# Patient Record
Sex: FEMALE | Race: WHITE | NOT HISPANIC OR LATINO | Employment: UNEMPLOYED | URBAN - METROPOLITAN AREA
[De-identification: names, ages, dates, MRNs, and addresses within clinical notes are randomized per-mention and may not be internally consistent; named-entity substitution may affect disease eponyms.]

---

## 2017-10-29 ENCOUNTER — HOSPITAL ENCOUNTER (EMERGENCY)
Facility: MEDICAL CENTER | Age: 47
End: 2017-10-29
Attending: EMERGENCY MEDICINE
Payer: MEDICAID

## 2017-10-29 VITALS
HEART RATE: 77 BPM | BODY MASS INDEX: 23.08 KG/M2 | RESPIRATION RATE: 18 BRPM | SYSTOLIC BLOOD PRESSURE: 100 MMHG | DIASTOLIC BLOOD PRESSURE: 59 MMHG | WEIGHT: 147.05 LBS | TEMPERATURE: 98.6 F | OXYGEN SATURATION: 95 % | HEIGHT: 67 IN

## 2017-10-29 DIAGNOSIS — R31.9 URINARY TRACT INFECTION WITH HEMATURIA, SITE UNSPECIFIED: ICD-10-CM

## 2017-10-29 DIAGNOSIS — N39.0 URINARY TRACT INFECTION WITH HEMATURIA, SITE UNSPECIFIED: ICD-10-CM

## 2017-10-29 DIAGNOSIS — G89.29 OTHER CHRONIC PAIN: ICD-10-CM

## 2017-10-29 DIAGNOSIS — B35.1 NAIL FUNGUS: ICD-10-CM

## 2017-10-29 LAB
APPEARANCE UR: ABNORMAL
COLOR UR: ABNORMAL
GLUCOSE UR STRIP.AUTO-MCNC: NEGATIVE MG/DL
HCG UR QL: NEGATIVE
KETONES UR STRIP.AUTO-MCNC: NEGATIVE MG/DL
LEUKOCYTE ESTERASE UR QL STRIP.AUTO: ABNORMAL
NITRITE UR QL STRIP.AUTO: POSITIVE
PH UR STRIP.AUTO: 5.5 [PH]
PROT UR QL STRIP: 30 MG/DL
RBC UR QL AUTO: NEGATIVE
SP GR UR STRIP.AUTO: 1.02

## 2017-10-29 PROCEDURE — 87086 URINE CULTURE/COLONY COUNT: CPT

## 2017-10-29 PROCEDURE — 99283 EMERGENCY DEPT VISIT LOW MDM: CPT

## 2017-10-29 PROCEDURE — 81025 URINE PREGNANCY TEST: CPT

## 2017-10-29 PROCEDURE — 81002 URINALYSIS NONAUTO W/O SCOPE: CPT

## 2017-10-29 RX ORDER — PRENATAL VIT 91/IRON/FOLIC/DHA 28-975-200
COMBINATION PACKAGE (EA) ORAL
Qty: 15 G | Refills: 0 | Status: SHIPPED | OUTPATIENT
Start: 2017-10-29

## 2017-10-29 RX ORDER — METHADONE HYDROCHLORIDE 10 MG/ML
10 CONCENTRATE ORAL DAILY
COMMUNITY

## 2017-10-29 RX ORDER — M-VIT,TX,IRON,MINS/CALC/FOLIC 27MG-0.4MG
1 TABLET ORAL DAILY
COMMUNITY

## 2017-10-29 RX ORDER — IBUPROFEN 200 MG
400 TABLET ORAL EVERY 6 HOURS PRN
COMMUNITY

## 2017-10-29 RX ORDER — CIPROFLOXACIN 500 MG/1
500 TABLET, FILM COATED ORAL 2 TIMES DAILY
Qty: 6 TAB | Refills: 0 | Status: SHIPPED | OUTPATIENT
Start: 2017-10-29 | End: 2017-11-01

## 2017-10-29 ASSESSMENT — PAIN SCALES - GENERAL: PAINLEVEL_OUTOF10: 6

## 2017-10-29 NOTE — ED PROVIDER NOTES
ED Provider Note    CHIEF COMPLAINT  Chief Complaint   Patient presents with   • Nail Problem       HPI  Angelica Moran is a 46 y.o. female who presents Stating that she has had a chronic fingernail and toenail infection for the last few years. The patient has a history of methamphetamine and heroin abuse and currently is on methadone from the methadone clinic. The patient reports that Mendy lieberman worked previously for her infection, but her insurance has stopped paying for this. She is followed at the [facility] and says that she will need to go through courses of other antifungals that failed before they will approve the Jubila. The patient also reports burning with urination, intermittent fever. She is concerned that she has a urinary tract infection and says these symptoms are similar with previous urinary tract infections. Her last miss her period was 3 months ago. She says her periods are very irregular.    REVIEW OF SYSTEMS  See HPI for further details. All other systems are negative.     PAST MEDICAL HISTORY   has a past medical history of Chronic back pain; Fibroid, uterine; Narcotic dependence (CMS-HCC); Pyelonephritis; and Renal disorder.    SOCIAL HISTORY  Social History     Social History Main Topics   • Smoking status: Current Every Day Smoker     Packs/day: 0.16     Years: 30.00     Types: Cigarettes   • Smokeless tobacco: Never Used      Comment: 1/4 ppd   • Alcohol use No      Comment: quit alcohol 2 years   • Drug use:      Types: Inhaled      Comment: marijuania - last meth and heroin 7/4/14 12 yr hx    • Sexual activity: Not on file       SURGICAL HISTORY   has a past surgical history that includes hill by laparoscopy (5/9/08); hill by laparoscopy (5/9/08); gyn surgery; other abdominal surgery; appendectomy; and other orthopedic surgery.    CURRENT MEDICATIONS  Home Medications     Reviewed by Quoc Schmid (Pharmacy Tech) on 10/29/17 at 1017  Med List Status:  "Complete   Medication Last Dose Status   Efinaconazole (JUBLIA) 10 % Solution > 2 weeks Active   ibuprofen (MOTRIN) 200 MG Tab 10/29/2017 Active   methadone (DOLOPHINE) 10 MG/ML Conc 10/28/2017 Active   Terbinafine (LAMISIL EX) > 4 days Active   therapeutic multivitamin-minerals (THERAGRAN-M) Tab 10/28/2017 Active                ALLERGIES  Allergies   Allergen Reactions   • Nkda [No Known Drug Allergy]        PHYSICAL EXAM  VITAL SIGNS: /75   Pulse (!) 104   Temp 37 °C (98.6 °F)   Resp 18   Ht 1.708 m (5' 7.25\")   Wt 66.7 kg (147 lb 0.8 oz)   SpO2 96%   BMI 22.86 kg/m²  @DIEGO[[id number]::@   Pulse ox interpretation: I interpret this pulse ox as normal.  Constitutional: Alert in no apparent distress.  HENT: No signs of trauma, Bilateral external ears normal, Nose normal.   Eyes: Pupils are equal and reactive, Conjunctiva normal, Non-icteric.   Neck: Normal range of motion, No tenderness, Supple, No stridor.   Lymphatic: No lymphadenopathy noted.   Cardiovascular: Regular rate and rhythm, no murmurs.   Thorax & Lungs: Normal breath sounds, No respiratory distress, No wheezing, No chest tenderness.   Abdomen: Bowel sounds normal, Soft, No tenderness, No masses, No pulsatile masses. No peritoneal signs.  Skin: Warm, Dry, No erythema, No rash.   Extremities: Intact distal pulses, No edema, No tenderness, No cyanosis. Onychomycosis of multiple toe and fingernails with no evidence of bacterial cellulitis or paronychia.  Musculoskeletal: Good range of motion in all major joints. No tenderness to palpation or major deformities noted.   Neurologic: Alert , Normal motor function, Normal sensory function, No focal deficits noted.   Psychiatric: Affect normal, Judgment normal, Mood normal.       DIAGNOSTIC STUDIES / PROCEDURES      LABS  Labs Reviewed   POC UA - Abnormal; Notable for the following:        Result Value    POC Appearance Slightly Cloudy (*)     POC Protein 30 (*)     POC Nitrites Positive (*)     POC " Leukocyte Esterase Small (*)     All other components within normal limits   POC URINE PREGNANCY   POC URINALYSIS   POC URINE PREGNANCY               COURSE & MEDICAL DECISION MAKING  Pertinent Labs & Imaging studies reviewed. (See chart for details)    Differential diagnosis: UTI, pregnancy, onchomycosis    I reviewed the patient's previous urine culture result from 5/5/2014 that showed she had resistance to Bactrim, sensitivity to Cipro.    The patient's urine today was positive for infection, negative for pregnancy. I will order a urine culture. I will treat the patient with Cipro for UTI.    The patient reports that she would like a prescription for Lamisil for her chronic onchomycosis.    The patient will return for new or worsening symptoms and is stable at the time of discharge.    The patient is referred to her primary physician at the [place] [facility] where she has an appointment this week for blood pressure management, diabetic screening, and for all other preventative health concerns.    DISPOSITION:  Patient will be discharged home in stable condition.    FOLLOW UP:  [facility], Emergency Dept  [address]  [facility] [address] [phone]    If symptoms worsen    [address] [address]  [phone]    follow-up as scheduled      OUTPATIENT MEDICATIONS:  New Prescriptions    CIPROFLOXACIN (CIPRO) 500 MG TAB    Take 1 Tab by mouth 2 times a day for 3 days.    TERBINAFINE (LAMISIL) 1 % CREAM    Apply as directed          The patient will return for worsening symptoms and is stable at the time of discharge. The patient verbalizes understanding and will comply.    FINAL IMPRESSION  1. Urinary tract infection with hematuria, site unspecified    2. Nail fungus    3. Other chronic pain               Electronically signed by: Zay Enriquez, 10/29/2017 10:26 AM

## 2017-10-29 NOTE — DISCHARGE INSTRUCTIONS
Urinary Tract Infection  Urinary tract infections (UTIs) can develop anywhere along your urinary tract. Your urinary tract is your body's drainage system for removing wastes and extra water. Your urinary tract includes two kidneys, two ureters, a bladder, and a urethra. Your kidneys are a pair of bean-shaped organs. Each kidney is about the size of your fist. They are located below your ribs, one on each side of your spine.  CAUSES  Infections are caused by microbes, which are microscopic organisms, including fungi, viruses, and bacteria. These organisms are so small that they can only be seen through a microscope. Bacteria are the microbes that most commonly cause UTIs.  SYMPTOMS   Symptoms of UTIs may vary by age and gender of the patient and by the location of the infection. Symptoms in young women typically include a frequent and intense urge to urinate and a painful, burning feeling in the bladder or urethra during urination. Older women and men are more likely to be tired, shaky, and weak and have muscle aches and abdominal pain. A fever may mean the infection is in your kidneys. Other symptoms of a kidney infection include pain in your back or sides below the ribs, nausea, and vomiting.  DIAGNOSIS  To diagnose a UTI, your caregiver will ask you about your symptoms. Your caregiver also will ask to provide a urine sample. The urine sample will be tested for bacteria and white blood cells. White blood cells are made by your body to help fight infection.  TREATMENT   Typically, UTIs can be treated with medication. Because most UTIs are caused by a bacterial infection, they usually can be treated with the use of antibiotics. The choice of antibiotic and length of treatment depend on your symptoms and the type of bacteria causing your infection.  HOME CARE INSTRUCTIONS  · If you were prescribed antibiotics, take them exactly as your caregiver instructs you. Finish the medication even if you feel better after you  have only taken some of the medication.  · Drink enough water and fluids to keep your urine clear or pale yellow.  · Avoid caffeine, tea, and carbonated beverages. They tend to irritate your bladder.  · Empty your bladder often. Avoid holding urine for long periods of time.  · Empty your bladder before and after sexual intercourse.  · After a bowel movement, women should cleanse from front to back. Use each tissue only once.  SEEK MEDICAL CARE IF:   · You have back pain.  · You develop a fever.  · Your symptoms do not begin to resolve within 3 days.  SEEK IMMEDIATE MEDICAL CARE IF:   · You have severe back pain or lower abdominal pain.  · You develop chills.  · You have nausea or vomiting.  · You have continued burning or discomfort with urination.  MAKE SURE YOU:   · Understand these instructions.  · Will watch your condition.  · Will get help right away if you are not doing well or get worse.     This information is not intended to replace advice given to you by your health care provider. Make sure you discuss any questions you have with your health care provider.     Document Released: 09/27/2006 Document Revised: 01/08/2016 Document Reviewed: 01/25/2013  [employer] Interactive Patient Education ©2016 [employer]

## 2017-10-29 NOTE — ED NOTES
[facility] for infection in nails in hands and feet. States has used Lamisil. [facility] Jublia helped but states Medicaid is not covering it. Was seen at hospitals last week and has an appointment next week as well.

## 2017-10-31 LAB
BACTERIA UR CULT: NORMAL
SIGNIFICANT IND 70042: NORMAL
SITE SITE: NORMAL
SOURCE SOURCE: NORMAL

## 2018-04-29 ENCOUNTER — APPOINTMENT (OUTPATIENT)
Dept: RADIOLOGY | Facility: MEDICAL CENTER | Age: 48
End: 2018-04-29
Attending: EMERGENCY MEDICINE
Payer: MEDICAID

## 2018-04-29 ENCOUNTER — HOSPITAL ENCOUNTER (EMERGENCY)
Facility: MEDICAL CENTER | Age: 48
End: 2018-04-29
Attending: EMERGENCY MEDICINE
Payer: MEDICAID

## 2018-04-29 VITALS
HEART RATE: 78 BPM | TEMPERATURE: 97.8 F | OXYGEN SATURATION: 93 % | RESPIRATION RATE: 18 BRPM | HEIGHT: 67 IN | WEIGHT: 138.89 LBS | SYSTOLIC BLOOD PRESSURE: 118 MMHG | DIASTOLIC BLOOD PRESSURE: 68 MMHG | BODY MASS INDEX: 21.8 KG/M2

## 2018-04-29 DIAGNOSIS — K59.00 CONSTIPATION, UNSPECIFIED CONSTIPATION TYPE: ICD-10-CM

## 2018-04-29 LAB
ALBUMIN SERPL BCP-MCNC: 3.8 G/DL (ref 3.2–4.9)
ALBUMIN/GLOB SERPL: 1.3 G/DL
ALP SERPL-CCNC: 63 U/L (ref 30–99)
ALT SERPL-CCNC: 14 U/L (ref 2–50)
ANION GAP SERPL CALC-SCNC: 3 MMOL/L (ref 0–11.9)
AST SERPL-CCNC: 33 U/L (ref 12–45)
BASOPHILS # BLD AUTO: 0.4 % (ref 0–1.8)
BASOPHILS # BLD: 0.02 K/UL (ref 0–0.12)
BILIRUB SERPL-MCNC: 0.6 MG/DL (ref 0.1–1.5)
BUN SERPL-MCNC: 18 MG/DL (ref 8–22)
CALCIUM SERPL-MCNC: 8.2 MG/DL (ref 8.4–10.2)
CHLORIDE SERPL-SCNC: 106 MMOL/L (ref 96–112)
CO2 SERPL-SCNC: 26 MMOL/L (ref 20–33)
CREAT SERPL-MCNC: 0.99 MG/DL (ref 0.5–1.4)
EOSINOPHIL # BLD AUTO: 0.32 K/UL (ref 0–0.51)
EOSINOPHIL NFR BLD: 7 % (ref 0–6.9)
ERYTHROCYTE [DISTWIDTH] IN BLOOD BY AUTOMATED COUNT: 37.9 FL (ref 35.9–50)
GLOBULIN SER CALC-MCNC: 2.9 G/DL (ref 1.9–3.5)
GLUCOSE SERPL-MCNC: 100 MG/DL (ref 65–99)
HCT VFR BLD AUTO: 38.3 % (ref 37–47)
HGB BLD-MCNC: 13 G/DL (ref 12–16)
IMM GRANULOCYTES # BLD AUTO: 0 K/UL (ref 0–0.11)
IMM GRANULOCYTES NFR BLD AUTO: 0 % (ref 0–0.9)
LIPASE SERPL-CCNC: 18 U/L (ref 7–58)
LYMPHOCYTES # BLD AUTO: 2.02 K/UL (ref 1–4.8)
LYMPHOCYTES NFR BLD: 44 % (ref 22–41)
MCH RBC QN AUTO: 29.3 PG (ref 27–33)
MCHC RBC AUTO-ENTMCNC: 33.9 G/DL (ref 33.6–35)
MCV RBC AUTO: 86.3 FL (ref 81.4–97.8)
MONOCYTES # BLD AUTO: 0.38 K/UL (ref 0–0.85)
MONOCYTES NFR BLD AUTO: 8.3 % (ref 0–13.4)
NEUTROPHILS # BLD AUTO: 1.85 K/UL (ref 2–7.15)
NEUTROPHILS NFR BLD: 40.3 % (ref 44–72)
NRBC # BLD AUTO: 0 K/UL
NRBC BLD-RTO: 0 /100 WBC
PLATELET # BLD AUTO: 198 K/UL (ref 164–446)
PMV BLD AUTO: 9.1 FL (ref 9–12.9)
POTASSIUM SERPL-SCNC: 4.7 MMOL/L (ref 3.6–5.5)
PROT SERPL-MCNC: 6.7 G/DL (ref 6–8.2)
RBC # BLD AUTO: 4.44 M/UL (ref 4.2–5.4)
SODIUM SERPL-SCNC: 135 MMOL/L (ref 135–145)
WBC # BLD AUTO: 4.6 K/UL (ref 4.8–10.8)

## 2018-04-29 PROCEDURE — 74177 CT ABD & PELVIS W/CONTRAST: CPT

## 2018-04-29 PROCEDURE — 96374 THER/PROPH/DIAG INJ IV PUSH: CPT

## 2018-04-29 PROCEDURE — 85025 COMPLETE CBC W/AUTO DIFF WBC: CPT

## 2018-04-29 PROCEDURE — 80053 COMPREHEN METABOLIC PANEL: CPT

## 2018-04-29 PROCEDURE — 700117 HCHG RX CONTRAST REV CODE 255: Performed by: EMERGENCY MEDICINE

## 2018-04-29 PROCEDURE — 96375 TX/PRO/DX INJ NEW DRUG ADDON: CPT

## 2018-04-29 PROCEDURE — 99285 EMERGENCY DEPT VISIT HI MDM: CPT

## 2018-04-29 PROCEDURE — 36415 COLL VENOUS BLD VENIPUNCTURE: CPT

## 2018-04-29 PROCEDURE — 700111 HCHG RX REV CODE 636 W/ 250 OVERRIDE (IP): Performed by: EMERGENCY MEDICINE

## 2018-04-29 PROCEDURE — 83690 ASSAY OF LIPASE: CPT

## 2018-04-29 RX ORDER — ONDANSETRON 2 MG/ML
4 INJECTION INTRAMUSCULAR; INTRAVENOUS ONCE
Status: COMPLETED | OUTPATIENT
Start: 2018-04-29 | End: 2018-04-29

## 2018-04-29 RX ORDER — MORPHINE SULFATE 4 MG/ML
4 INJECTION, SOLUTION INTRAMUSCULAR; INTRAVENOUS ONCE
Status: COMPLETED | OUTPATIENT
Start: 2018-04-29 | End: 2018-04-29

## 2018-04-29 RX ADMIN — ONDANSETRON HYDROCHLORIDE 4 MG: 2 INJECTION, SOLUTION INTRAMUSCULAR; INTRAVENOUS at 19:31

## 2018-04-29 RX ADMIN — IOHEXOL 100 ML: 350 INJECTION, SOLUTION INTRAVENOUS at 19:20

## 2018-04-29 RX ADMIN — MORPHINE SULFATE 4 MG: 4 INJECTION INTRAVENOUS at 19:32

## 2018-04-29 ASSESSMENT — PAIN SCALES - GENERAL
PAINLEVEL_OUTOF10: 5
PAINLEVEL_OUTOF10: 7

## 2018-04-29 ASSESSMENT — LIFESTYLE VARIABLES: DO YOU DRINK ALCOHOL: NO

## 2018-04-30 NOTE — ED NOTES
Pt returned from ct. Updated on plan of care. Denies any needs at this time. Will continue to monitor

## 2018-04-30 NOTE — ED NOTES
"Pt presents with complaints of constipation and transitory abdominal pain \"for months.'  She is followed by her PCP.   "
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
## 2018-04-30 NOTE — ED NOTES
Written and verbal discharge instructions reviewed with pt, verbalized understanding. Vital signs WNL. Pt ambulated without difficulty  to discharge. States will be calling a cab for transportation home.

## 2018-04-30 NOTE — DISCHARGE INSTRUCTIONS
Constipation, Adult  Constipation is when a person has fewer bowel movements in a week than normal, has difficulty having a bowel movement, or has stools that are dry, hard, or larger than normal. Constipation may be caused by an underlying condition. It may become worse with age if a person takes certain medicines and does not take in enough fluids.  Follow these instructions at home:  Eating and drinking  · Eat foods that have a lot of fiber, such as fresh fruits and vegetables, whole grains, and beans.  · Limit foods that are high in fat, low in fiber, or overly processed, such as french fries, hamburgers, cookies, candies, and soda.  · Drink enough fluid to keep your urine clear or pale yellow.  General instructions  · Exercise regularly or as told by your health care provider.  · Go to the restroom when you have the urge to go. Do not hold it in.  · Take over-the-counter and prescription medicines only as told by your health care provider. These include any fiber supplements.  · Practice pelvic floor retraining exercises, such as deep breathing while relaxing the lower abdomen and pelvic floor relaxation during bowel movements.  · Watch your condition for any changes.  · Keep all follow-up visits as told by your health care provider. This is important.  Contact a health care provider if:  · You have pain that gets worse.  · You have a fever.  · You do not have a bowel movement after 4 days.  · You vomit.  · You are not hungry.  · You lose weight.  · You are bleeding from the anus.  · You have thin, pencil-like stools.  Get help right away if:  · You have a fever and your symptoms suddenly get worse.  · You leak stool or have blood in your stool.  · Your abdomen is bloated.  · You have severe pain in your abdomen.  · You feel dizzy or you faint.  This information is not intended to replace advice given to you by your health care provider. Make sure you discuss any questions you have with your health care  provider.  Document Released: 09/15/2005 Document Revised: 07/07/2017 Document Reviewed: 06/07/2017  [employer] Interactive Patient Education © 2017 [employer]

## 2018-04-30 NOTE — ED NOTES
"Pt reports \"pain a little better\" updated on plan of care. Will continue to monitor. Call light within reach  "

## 2018-04-30 NOTE — ED NOTES
Assessment complete. IV placed, blood sent to the lab. Pt updated on plan of care. Reports has been using prescribed enemas, suppositories and over the counter medications to help relieve constipation x 1 week. abd tender to palpation huong lower quadrants, firm. Pt reports fever, n/v x 2 days.

## 2020-12-15 ENCOUNTER — HOSPITAL ENCOUNTER (EMERGENCY)
Facility: MEDICAL CENTER | Age: 50
End: 2020-12-15
Attending: EMERGENCY MEDICINE
Payer: MEDICAID

## 2020-12-15 VITALS
TEMPERATURE: 97.2 F | HEART RATE: 69 BPM | DIASTOLIC BLOOD PRESSURE: 57 MMHG | OXYGEN SATURATION: 97 % | RESPIRATION RATE: 16 BRPM | SYSTOLIC BLOOD PRESSURE: 102 MMHG

## 2020-12-15 DIAGNOSIS — S01.81XA FACIAL LACERATION, INITIAL ENCOUNTER: ICD-10-CM

## 2020-12-15 PROCEDURE — 99282 EMERGENCY DEPT VISIT SF MDM: CPT

## 2020-12-15 PROCEDURE — 303353 HCHG DERMABOND SKIN ADHESIVE

## 2020-12-15 PROCEDURE — 304999 HCHG REPAIR-SIMPLE/INTERMED LEVEL 1

## 2020-12-15 NOTE — ED NOTES
Pt's friend angelia called but no answer, voice mail/message left for the pt's friend angelia at [phone]

## 2020-12-15 NOTE — ED PROVIDER NOTES
"ED Provider Note    \"    CHIEF COMPLAINT  Chief Complaint   Patient presents with   • Head Laceration     Pt staying at [facility] and walked into the wall. Lac to forehead, bleeding controlled. Denied LOC, blood thinners.        HPI  Angelica Moran is a 50 y.o. female who presents with a laceration of the forehead.  Walked into the corner of a wall.  Sustained a laceration.  No loss of consciousness.  No headache.  She felt dazed and nauseous immediately after but no symptoms now.  Bleeding controlled.  Mild pain at the location.  Unknown tetanus.    REVIEW OF SYSTEMS  No numbness or weakness    PAST MEDICAL HISTORY  Past Medical History:   Diagnosis Date   • Chronic back pain    • Fibroid, uterine    • Narcotic dependence (HCC)    • Pyelonephritis    • Renal disorder     renal calculi       SOCIAL HISTORY  Social History     Tobacco Use   • Smoking status: Current Every Day Smoker     Packs/day: 0.16     Years: 30.00     Pack years: 4.80     Types: Cigarettes   • Smokeless tobacco: Never Used   • Tobacco comment: 1/4 ppd   Substance Use Topics   • Alcohol use: No     Comment: quit alcohol 2 years   • Drug use: Yes     Types: Inhaled     Comment: marijuania - last meth and heroin 18 months ago       CURRENT MEDICATIONS  Home Medications    **Home medications have not yet been reviewed for this encounter**         ALLERGIES  Allergies   Allergen Reactions   • Nkda [No Known Drug Allergy]        PHYSICAL EXAM  VITAL SIGNS: /75   Pulse 92   Temp 35.9 °C (96.7 °F) (Temporal)   Resp 16   SpO2 96%    Constitutional: No distress  HENT: Vertical laceration just medial to the right eyebrow that is well approximated.  Minimal associated hematoma.  Eyes: Injected conjunctiva.  Streaking mascara.  Cardiovascular: Normal heart rate  Thorax & Lungs: No respiratory distress  Skin: As above  Extremities: As mentioned above  Neurologic: Awake alert oriented.  Symmetric motor and sensory.  Steady gait  Psychiatric: " "Affect normal      COURSE & MEDICAL DECISION MAKING  Patient is neurovascularly intact. No foreign bodies. No motor dysfunction.    Laceration Repair Procedure Note    Indication: Laceration    Procedure: The patient was placed in the appropriate position wound was cleansed with Betadine and saline.  Dermabond was applied.      Total repaired wound length: 3 cm.     The patient tolerated the procedure well.    Should wash wound twice daily. Keep clean at all times. Return to the emergency department for any signs of infection, including: Fevers, expanding redness, purulent drainage.  Standard skin glue instructions given    FINAL IMPRESSION  1. Laceration         This dictation was created using voice recognition software. The accuracy of the dictation is limited to the abilities of the software. I expect there may be some errors of grammar and possibly content. The nursing notes were reviewed and certain aspects of this information were incorporated into this note.    Electronically signed by: Luke Mills M.D., 12/15/2020 6:56 AM  \"    "

## 2020-12-15 NOTE — ED NOTES
Discharge instructions given to pt including follow up with pcp or returning if no improvement of symptoms or to return if worse. Questions answered by RN. Denies any new complaints. Discharged w/stable vitals and able to ambulate w/steady gait. Laceration in right forehead bleeding controlled. dermabond dry, educated on how to keep laceration clean and to watch for s/sx of infection.

## 2020-12-15 NOTE — ED TRIAGE NOTES
Chief Complaint   Patient presents with   • Head Laceration     Pt staying at [facility] and walked into the wall. Lac to forehead, bleeding controlled. Denied LOC, blood thinners.      Pt amb to triage with steady gait for above complaint.   Pt is alert and oriented, speaking in full sentences, follows commands and responds appropriately to questions. Resp are even and unlabored. No obvious acute distress.    Pt placed in lobby. Pt educated on triage process. Pt encouraged to alert staff for any changes.    Vitals:    12/15/20 0442   BP: 112/75   Pulse: 92   Resp: 16   Temp: 35.9 °C (96.7 °F)   SpO2: 96%

## 2024-06-27 ENCOUNTER — APPOINTMENT (OUTPATIENT)
Dept: RADIOLOGY | Facility: MEDICAL CENTER | Age: 54
End: 2024-06-27
Attending: STUDENT IN AN ORGANIZED HEALTH CARE EDUCATION/TRAINING PROGRAM
Payer: MEDICAID

## 2024-06-27 ENCOUNTER — HOSPITAL ENCOUNTER (EMERGENCY)
Facility: MEDICAL CENTER | Age: 54
End: 2024-06-27
Attending: STUDENT IN AN ORGANIZED HEALTH CARE EDUCATION/TRAINING PROGRAM
Payer: MEDICAID

## 2024-06-27 VITALS
DIASTOLIC BLOOD PRESSURE: 62 MMHG | WEIGHT: 135 LBS | TEMPERATURE: 98.2 F | HEART RATE: 72 BPM | HEIGHT: 67 IN | BODY MASS INDEX: 21.19 KG/M2 | SYSTOLIC BLOOD PRESSURE: 114 MMHG | OXYGEN SATURATION: 96 % | RESPIRATION RATE: 16 BRPM

## 2024-06-27 DIAGNOSIS — S82.892A CLOSED AVULSION FRACTURE OF LEFT ANKLE, INITIAL ENCOUNTER: ICD-10-CM

## 2024-06-27 PROCEDURE — 73590 X-RAY EXAM OF LOWER LEG: CPT | Mod: LT

## 2024-06-27 PROCEDURE — 73630 X-RAY EXAM OF FOOT: CPT | Mod: LT

## 2024-06-27 PROCEDURE — 700102 HCHG RX REV CODE 250 W/ 637 OVERRIDE(OP): Performed by: STUDENT IN AN ORGANIZED HEALTH CARE EDUCATION/TRAINING PROGRAM

## 2024-06-27 PROCEDURE — 73610 X-RAY EXAM OF ANKLE: CPT | Mod: LT

## 2024-06-27 PROCEDURE — A9270 NON-COVERED ITEM OR SERVICE: HCPCS | Performed by: STUDENT IN AN ORGANIZED HEALTH CARE EDUCATION/TRAINING PROGRAM

## 2024-06-27 PROCEDURE — 99284 EMERGENCY DEPT VISIT MOD MDM: CPT

## 2024-06-27 RX ORDER — OXYCODONE HYDROCHLORIDE 5 MG/1
5 TABLET ORAL ONCE
Status: COMPLETED | OUTPATIENT
Start: 2024-06-27 | End: 2024-06-27

## 2024-06-27 RX ORDER — FLUOXETINE 10 MG/1
10 CAPSULE ORAL DAILY
COMMUNITY

## 2024-06-27 RX ADMIN — OXYCODONE 5 MG: 5 TABLET ORAL at 03:45

## 2024-06-27 ASSESSMENT — LIFESTYLE VARIABLES
HAVE YOU EVER FELT YOU SHOULD CUT DOWN ON YOUR DRINKING: NO
DO YOU DRINK ALCOHOL: NO
EVER FELT BAD OR GUILTY ABOUT YOUR DRINKING: NO
EVER HAD A DRINK FIRST THING IN THE MORNING TO STEADY YOUR NERVES TO GET RID OF A HANGOVER: NO
AVERAGE NUMBER OF DAYS PER WEEK YOU HAVE A DRINK CONTAINING ALCOHOL: 0
CONSUMPTION TOTAL: NEGATIVE
TOTAL SCORE: 0
HOW MANY TIMES IN THE PAST YEAR HAVE YOU HAD 5 OR MORE DRINKS IN A DAY: 0
TOTAL SCORE: 0
ON A TYPICAL DAY WHEN YOU DRINK ALCOHOL HOW MANY DRINKS DO YOU HAVE: 0
TOTAL SCORE: 0
HAVE PEOPLE ANNOYED YOU BY CRITICIZING YOUR DRINKING: NO

## 2024-06-27 NOTE — ED TRIAGE NOTES
"Chief Complaint   Patient presents with    Ankle Pain     PT BIB EMS from the [facility] for ankle injury. Pt was sitting at a slot machine for about an hour, pt went to get up and she rolled her ankle. CSM intact. Noticeable swelling to left ankle. Pt was given 1000mg of tylenol and 600 of ibuprofen      /67   Pulse 73   Temp 36.6 °C (97.8 °F) (Temporal)   Ht 1.702 m (5' 7\")   Wt 61.2 kg (135 lb)   SpO2 97%   BMI 21.14 kg/m²       "

## 2024-06-27 NOTE — DISCHARGE INSTRUCTIONS
You were seen for ankle pain. On the ankle x-ray, there is a small area of the bone that appears to be chipped away which may be a tiny fracture however this is a stable fracture. Use the boot and crutches as you need them for pain. Take tylenol 1000 mg and ibuprofen 600 mg every 6 hours for pain. I have referred you to orthopedics for further care. Call them tomorrow to schedule follow up.     You should also rest, use ice, elevate your leg above the level of your heart whenever possible to help with the pain and swelling.     DX-FOOT-COMPLETE 3+ LEFT   Final Result         1.  No acute traumatic bony injury.      DX-ANKLE 3+ VIEWS LEFT   Final Result         1.  Cortical irregularity of the tip the lateral malleolus, appearance compatible small ligamentous avulsion fracture fragment.         DX-TIBIA AND FIBULA LEFT   Final Result         1.  Cortical irregularity at the tip the lateral malleolus, appearance compatible with small ligaments avulsion fracture fragment.

## 2024-06-27 NOTE — ED PROVIDER NOTES
ED Provider Note    CHIEF COMPLAINT  Chief Complaint   Patient presents with    Ankle Pain     PT BIB EMS from the [facility] for ankle injury. Pt was sitting at a slot machine for about an hour, pt went to get up and she rolled her ankle. CSM intact. Noticeable swelling to left ankle. Pt was given 1000mg of tylenol and 600 of ibuprofen        EXTERNAL RECORDS REVIEWED  Inpatient Notes patient was admitted for pyelonephritis in 2011    HPI/ROS  LIMITATION TO HISTORY   Select: : None  OUTSIDE HISTORIAN(S):  None    Angelica Moran is a 53 y.o. female who presents for evaluation of left ankle pain.  This started just prior to arrival.  She is at the [employer] on machine.  She was at the machine for approximately an hour.  She got up to walk away from the machine and rolled her ankle.  She has been unable to walk due to ankle pain.  Pain mainly in the left lateral ankle.  She not hit her head or lose consciousness.  She has no neck or back pain she has no chest pain or abdominal pain.  She is given Tylenol and Motrin by EMS.  She denies any history of previous ankle injury    PAST MEDICAL HISTORY   has a past medical history of Chronic back pain, Fibroid, uterine, Narcotic dependence (HCC), Pyelonephritis, and Renal disorder.    SURGICAL HISTORY   has a past surgical history that includes hill by laparoscopy (5/9/08); hill by laparoscopy (5/9/08); gyn surgery; other abdominal surgery; appendectomy; and other orthopedic surgery.    FAMILY HISTORY  No family history on file.    SOCIAL HISTORY  Social History     Tobacco Use    Smoking status: Every Day     Current packs/day: 0.16     Average packs/day: 0.2 packs/day for 30.0 years (4.8 ttl pk-yrs)     Types: Cigarettes    Smokeless tobacco: Never    Tobacco comments:     1/4 ppd   Substance and Sexual Activity    Alcohol use: No     Comment: quit alcohol 2 years    Drug use: Yes     Types: Inhaled     Comment: marijuania - last meth and heroin 18 months  "ago    Sexual activity: Not on file       CURRENT MEDICATIONS  Home Medications    **Home medications have not yet been reviewed for this encounter**         ALLERGIES  Allergies   Allergen Reactions    Nkda [No Known Drug Allergy]        PHYSICAL EXAM  VITAL SIGNS: /67   Pulse 73   Temp 36.6 °C (97.8 °F) (Temporal)   Resp 16   Ht 1.702 m (5' 7\")   Wt 61.2 kg (135 lb)   SpO2 97%   BMI 21.14 kg/m²      Constitutional: Well developed, Well nourished, No acute respiratory distress, Non-toxic appearance.   HENT: Normocephalic, Atraumatic,   Neck: Normal range of motion. Supple. No midline C spine tenderness, step off or deformity  Cardiovascular: Normal heart rate, Normal rhythm, No murmurs, No rubs, No gallops.   Thorax & Lungs: Clear to auscultation bilaterally, No respiratory distress, No wheezing.  Abdomen: Soft nontender   Skin: Warm, Dry, No erythema, No rash.   Extremities: Intact distal pulses, Left ankle with swelling, tenderness to lateral malleolus, mild tenderness to mid tib/fib but no tenderness to left hip, femur, knee, has full range of motion to the hip and knee. Mild tenderness to dorsal aspect of foot but moreso in the ankle. No tenderness to the base of the fifth metatarsal.  Bilateral upper extremities are atraumatic with full range of motion all major joints, right lower extremity is atraumatic with full range of motion in all major joints  Neurologic: Alert & oriented. No focal deficits noted.   Psych: Alert normal affect       RADIOLOGY/PROCEDURES   I have independently interpreted the diagnostic imaging associated with this visit and am waiting the final reading from the radiologist.   My preliminary interpretation is as follows: no dislocation    Radiologist interpretation:  DX-FOOT-COMPLETE 3+ LEFT   Final Result         1.  No acute traumatic bony injury.      DX-ANKLE 3+ VIEWS LEFT   Final Result         1.  Cortical irregularity of the tip the lateral malleolus, appearance " compatible small ligamentous avulsion fracture fragment.         DX-TIBIA AND FIBULA LEFT   Final Result         1.  Cortical irregularity at the tip the lateral malleolus, appearance compatible with small ligaments avulsion fracture fragment.          COURSE & MEDICAL DECISION MAKING    ASSESSMENT, COURSE AND PLAN  Care Narrative:   This is a 53-year-old female with history as noted above who presents with left ankle pain after she rolled her ankle when she was stated.  No changes prior to arrival.  On arrival her vital signs are stable.  She did not hit her head or lose consciousness.  She has no pain to her neck or back.  She has no pain to her chest or abdomen.  No indication for laboratory studies.  Pain is limited to the ankle mostly on the left lateral side.  She does have some swelling about the ankle joint.  The left lower extremity is neurovascularly intact.  She otherwise has full range of motion in all of her other joints.  I considered fracture, sprain, dislocation.  X-rays of the tib-fib, ankle, foot were performed and shows cortical irregularity of the tip of the lateral malleolus which may represent a small ligamentous avulsion fracture fragment.  Otherwise there is no obvious fracture.    Patient will be placed in a walking boot and given crutches.  Referral to orthopedics placed.  Discussed results with patient.  She is awake and tolerating p.o.  She has no new complaints.  Advised Tylenol and Motrin for pain as well as rest, ice, elevation of the extremity.  She is to return for any new or worsening symptoms.  Patient is agreeable to discharge plan over the questions.          ADDITIONAL PROBLEMS MANAGED  None    DISPOSITION AND DISCUSSIONS  I have discussed management of the patient with the following physicians and TL's:  None    Discussion of management with other QHP or appropriate source(s): None     Escalation of care considered, and ultimately not performed:Laboratory analysis    Barriers  to care at this time, including but not limited to:  None .     Decision tools and prescription drugs considered including, but not limited to:  None .     The patient will return for new or worsening symptoms and is stable at the time of discharge.    The patient is referred to a primary physician for blood pressure management, diabetic screening, and for all other preventative health concerns.      DISPOSITION:  Patient will be discharged home in stable condition.    FOLLOW UP:  Belen Stephen M.D.  [address] [place] [id number]  [phone]            FINAL DIAGNOSIS  1. Closed avulsion fracture of left ankle, initial encounter           Electronically signed by: Dora Villanueva M.D., 6/27/2024 3:28 AM

## 2025-05-18 ENCOUNTER — HOSPITAL ENCOUNTER (EMERGENCY)
Facility: MEDICAL CENTER | Age: 55
End: 2025-05-18
Attending: STUDENT IN AN ORGANIZED HEALTH CARE EDUCATION/TRAINING PROGRAM
Payer: MEDICAID

## 2025-05-18 VITALS
TEMPERATURE: 96.6 F | BODY MASS INDEX: 20.83 KG/M2 | RESPIRATION RATE: 21 BRPM | WEIGHT: 132.72 LBS | OXYGEN SATURATION: 95 % | SYSTOLIC BLOOD PRESSURE: 101 MMHG | HEART RATE: 84 BPM | HEIGHT: 67 IN | DIASTOLIC BLOOD PRESSURE: 58 MMHG

## 2025-05-18 DIAGNOSIS — N39.0 ACUTE UTI: Primary | ICD-10-CM

## 2025-05-18 DIAGNOSIS — L30.9 DERMATITIS: ICD-10-CM

## 2025-05-18 LAB
ALBUMIN SERPL BCP-MCNC: 4 G/DL (ref 3.2–4.9)
ALBUMIN/GLOB SERPL: 1.2 G/DL
ALP SERPL-CCNC: 120 U/L (ref 30–99)
ALT SERPL-CCNC: 17 U/L (ref 2–50)
ANION GAP SERPL CALC-SCNC: 10 MMOL/L (ref 7–16)
APPEARANCE UR: CLEAR
AST SERPL-CCNC: 24 U/L (ref 12–45)
BACTERIA #/AREA URNS HPF: ABNORMAL /HPF
BASOPHILS # BLD AUTO: 0.6 % (ref 0–1.8)
BASOPHILS # BLD: 0.03 K/UL (ref 0–0.12)
BILIRUB SERPL-MCNC: 0.2 MG/DL (ref 0.1–1.5)
BILIRUB UR QL STRIP.AUTO: NEGATIVE
BUN SERPL-MCNC: 18 MG/DL (ref 8–22)
CALCIUM ALBUM COR SERPL-MCNC: 8.9 MG/DL (ref 8.5–10.5)
CALCIUM SERPL-MCNC: 8.9 MG/DL (ref 8.5–10.5)
CASTS URNS QL MICRO: ABNORMAL /LPF (ref 0–2)
CHLORIDE SERPL-SCNC: 101 MMOL/L (ref 96–112)
CO2 SERPL-SCNC: 25 MMOL/L (ref 20–33)
COLOR UR: YELLOW
CREAT SERPL-MCNC: 1 MG/DL (ref 0.5–1.4)
EOSINOPHIL # BLD AUTO: 0.13 K/UL (ref 0–0.51)
EOSINOPHIL NFR BLD: 2.7 % (ref 0–6.9)
EPITHELIAL CELLS 1715: ABNORMAL /HPF (ref 0–5)
ERYTHROCYTE [DISTWIDTH] IN BLOOD BY AUTOMATED COUNT: 41.2 FL (ref 35.9–50)
GFR SERPLBLD CREATININE-BSD FMLA CKD-EPI: 66 ML/MIN/1.73 M 2
GLOBULIN SER CALC-MCNC: 3.4 G/DL (ref 1.9–3.5)
GLUCOSE SERPL-MCNC: 78 MG/DL (ref 65–99)
GLUCOSE UR STRIP.AUTO-MCNC: NEGATIVE MG/DL
HCT VFR BLD AUTO: 38.5 % (ref 37–47)
HGB BLD-MCNC: 13 G/DL (ref 12–16)
IMM GRANULOCYTES # BLD AUTO: 0.01 K/UL (ref 0–0.11)
IMM GRANULOCYTES NFR BLD AUTO: 0.2 % (ref 0–0.9)
KETONES UR STRIP.AUTO-MCNC: NEGATIVE MG/DL
LEUKOCYTE ESTERASE UR QL STRIP.AUTO: ABNORMAL
LIPASE SERPL-CCNC: 19 U/L (ref 11–82)
LYMPHOCYTES # BLD AUTO: 1.97 K/UL (ref 1–4.8)
LYMPHOCYTES NFR BLD: 40.8 % (ref 22–41)
MCH RBC QN AUTO: 29.4 PG (ref 27–33)
MCHC RBC AUTO-ENTMCNC: 33.8 G/DL (ref 32.2–35.5)
MCV RBC AUTO: 87.1 FL (ref 81.4–97.8)
MICRO URNS: ABNORMAL
MONOCYTES # BLD AUTO: 0.55 K/UL (ref 0–0.85)
MONOCYTES NFR BLD AUTO: 11.4 % (ref 0–13.4)
NEUTROPHILS # BLD AUTO: 2.14 K/UL (ref 1.82–7.42)
NEUTROPHILS NFR BLD: 44.3 % (ref 44–72)
NITRITE UR QL STRIP.AUTO: NEGATIVE
NRBC # BLD AUTO: 0 K/UL
NRBC BLD-RTO: 0 /100 WBC (ref 0–0.2)
PH UR STRIP.AUTO: 7 [PH] (ref 5–8)
PLATELET # BLD AUTO: 225 K/UL (ref 164–446)
PMV BLD AUTO: 9.3 FL (ref 9–12.9)
POTASSIUM SERPL-SCNC: 4.3 MMOL/L (ref 3.6–5.5)
PROT SERPL-MCNC: 7.4 G/DL (ref 6–8.2)
PROT UR QL STRIP: NEGATIVE MG/DL
RBC # BLD AUTO: 4.42 M/UL (ref 4.2–5.4)
RBC # URNS HPF: ABNORMAL /HPF
RBC UR QL AUTO: NEGATIVE
SODIUM SERPL-SCNC: 136 MMOL/L (ref 135–145)
SP GR UR STRIP.AUTO: 1.01
UROBILINOGEN UR STRIP.AUTO-MCNC: 0.2 EU/DL
WBC # BLD AUTO: 4.8 K/UL (ref 4.8–10.8)
WBC #/AREA URNS HPF: ABNORMAL /HPF

## 2025-05-18 PROCEDURE — 80053 COMPREHEN METABOLIC PANEL: CPT

## 2025-05-18 PROCEDURE — 700102 HCHG RX REV CODE 250 W/ 637 OVERRIDE(OP): Performed by: STUDENT IN AN ORGANIZED HEALTH CARE EDUCATION/TRAINING PROGRAM

## 2025-05-18 PROCEDURE — 87186 SC STD MICRODIL/AGAR DIL: CPT

## 2025-05-18 PROCEDURE — 36415 COLL VENOUS BLD VENIPUNCTURE: CPT

## 2025-05-18 PROCEDURE — 87086 URINE CULTURE/COLONY COUNT: CPT

## 2025-05-18 PROCEDURE — 81001 URINALYSIS AUTO W/SCOPE: CPT

## 2025-05-18 PROCEDURE — 83690 ASSAY OF LIPASE: CPT

## 2025-05-18 PROCEDURE — 99285 EMERGENCY DEPT VISIT HI MDM: CPT

## 2025-05-18 PROCEDURE — 85025 COMPLETE CBC W/AUTO DIFF WBC: CPT

## 2025-05-18 PROCEDURE — 700111 HCHG RX REV CODE 636 W/ 250 OVERRIDE (IP): Mod: JZ | Performed by: STUDENT IN AN ORGANIZED HEALTH CARE EDUCATION/TRAINING PROGRAM

## 2025-05-18 PROCEDURE — 96374 THER/PROPH/DIAG INJ IV PUSH: CPT

## 2025-05-18 PROCEDURE — A9270 NON-COVERED ITEM OR SERVICE: HCPCS | Performed by: STUDENT IN AN ORGANIZED HEALTH CARE EDUCATION/TRAINING PROGRAM

## 2025-05-18 PROCEDURE — 96375 TX/PRO/DX INJ NEW DRUG ADDON: CPT

## 2025-05-18 PROCEDURE — 87077 CULTURE AEROBIC IDENTIFY: CPT

## 2025-05-18 RX ORDER — SULFAMETHOXAZOLE AND TRIMETHOPRIM 800; 160 MG/1; MG/1
1 TABLET ORAL 2 TIMES DAILY
Qty: 10 TABLET | Refills: 0 | Status: ACTIVE | OUTPATIENT
Start: 2025-05-18 | End: 2025-05-23

## 2025-05-18 RX ORDER — OXYCODONE HYDROCHLORIDE 10 MG/1
10 TABLET ORAL ONCE
Refills: 0 | Status: COMPLETED | OUTPATIENT
Start: 2025-05-18 | End: 2025-05-18

## 2025-05-18 RX ORDER — PHENAZOPYRIDINE HYDROCHLORIDE 200 MG/1
200 TABLET, FILM COATED ORAL 3 TIMES DAILY PRN
Qty: 6 TABLET | Refills: 0 | Status: SHIPPED | OUTPATIENT
Start: 2025-05-18

## 2025-05-18 RX ORDER — KETOROLAC TROMETHAMINE 15 MG/ML
15 INJECTION, SOLUTION INTRAMUSCULAR; INTRAVENOUS ONCE
Status: COMPLETED | OUTPATIENT
Start: 2025-05-18 | End: 2025-05-18

## 2025-05-18 RX ORDER — BENZOCAINE/MENTHOL 6 MG-10 MG
1 LOZENGE MUCOUS MEMBRANE 2 TIMES DAILY
Qty: 1 EACH | Refills: 0 | Status: SHIPPED | OUTPATIENT
Start: 2025-05-18 | End: 2025-06-17

## 2025-05-18 RX ORDER — MORPHINE SULFATE 4 MG/ML
4 INJECTION INTRAVENOUS ONCE
Status: COMPLETED | OUTPATIENT
Start: 2025-05-18 | End: 2025-05-18

## 2025-05-18 RX ORDER — SULFAMETHOXAZOLE AND TRIMETHOPRIM 800; 160 MG/1; MG/1
1 TABLET ORAL ONCE
Status: COMPLETED | OUTPATIENT
Start: 2025-05-18 | End: 2025-05-18

## 2025-05-18 RX ADMIN — SULFAMETHOXAZOLE AND TRIMETHOPRIM 1 TABLET: 800; 160 TABLET ORAL at 06:31

## 2025-05-18 RX ADMIN — MORPHINE SULFATE 4 MG: 4 INJECTION, SOLUTION INTRAMUSCULAR; INTRAVENOUS at 05:24

## 2025-05-18 RX ADMIN — OXYCODONE HYDROCHLORIDE 10 MG: 10 TABLET ORAL at 06:28

## 2025-05-18 RX ADMIN — KETOROLAC TROMETHAMINE 15 MG: 15 INJECTION, SOLUTION INTRAMUSCULAR; INTRAVENOUS at 05:23

## 2025-05-18 ASSESSMENT — PAIN DESCRIPTION - PAIN TYPE
TYPE: ACUTE PAIN

## 2025-05-18 NOTE — ED TRIAGE NOTES
Patient ambulates to Er with a chief complaint of lower abdominal pain. Patient states that she has had an increase in the urge of wanting to urinate but has been having issues going to the bathroom and when she does urinate pt states it is painful. Pt states it has been going on for about 3 days now with constipation.

## 2025-05-18 NOTE — DISCHARGE INSTRUCTIONS
Please return if you have worsening pain, fever, inability to eat or drink, flank pain or other concern.

## 2025-05-18 NOTE — ED PROVIDER NOTES
ED Provider Note    CHIEF COMPLAINT  Chief Complaint   Patient presents with    Abdominal Pain    Constipation    UTI     Patient ambulates to Er with a chief complaint of lower abdominal pain. Patient states that she has had an increase in the urge of wanting to urinate but has been having issues going to the bathroom and when she does urinate pt states it is painful. Pt states it has been going on for about 3 days now with constipation.       EXTERNAL RECORDS REVIEWED  Seen as an outpatient 2024 for closed avulsion fracture of the left ankle    HPI/ROS  LIMITATION TO HISTORY   none  OUTSIDE HISTORIAN(S):  none    Angelica Moran is a 54 y.o. female who presents with dysuria and suprapubic abdominal pain.  She says she has had symptoms now for 3 days.  She has the urge to urinate says it has been very dark and has associated dysuria.  She has pain throughout her lower abdomen, radiating upwards but no unilateral flank pain.  She reports subjective fevers has been taking Motrin.  No nausea or vomiting.  She is constipated but says this is a chronic issue for her her last bowel movement was yesterday. She does not take any laxatives or medications.  She reports multiple prior abdominal surgeries including a cholecystectomy and appendectomy and a .  She has also had a prior hernia repair.  She denies any chest pain or shortness of breath.    PAST MEDICAL HISTORY   has a past medical history of Chronic back pain, Fibroid, uterine, Narcotic dependence (HCC), Pyelonephritis, and Renal disorder.    SURGICAL HISTORY   has a past surgical history that includes hill by laparoscopy (08); hill by laparoscopy (08); gyn surgery; other abdominal surgery; appendectomy; and other orthopedic surgery.    FAMILY HISTORY  No family history on file.    SOCIAL HISTORY  Social History     Tobacco Use    Smoking status: Every Day     Current packs/day: 0.16     Average packs/day: 0.2 packs/day for 30.0  "years (4.8 ttl pk-yrs)     Types: Cigarettes    Smokeless tobacco: Never    Tobacco comments:     1/4 ppd   Substance and Sexual Activity    Alcohol use: No     Comment: quit alcohol 2 years    Drug use: Not Currently     Types: Inhaled     Comment: sober 3 years    Sexual activity: Not on file       CURRENT MEDICATIONS  Home Medications    **Home medications have not yet been reviewed for this encounter**         ALLERGIES  Allergies[1]    PHYSICAL EXAM  VITAL SIGNS: /58   Pulse 84   Temp 35.9 °C (96.6 °F) (Temporal)   Resp (!) 21   Ht 1.702 m (5' 7\")   Wt 60.2 kg (132 lb 11.5 oz)   SpO2 95%   BMI 20.79 kg/m²    Constitutional: Awake and alert Non toxic  HENT: Normal inspection.  Moist mucous membranes  Eyes: Normal inspection  Neck: Grossly normal range of motion.  Cardiovascular: Normal heart rate, Normal rhythm.    Thorax & Lungs: No respiratory distress  Abdomen: Soft, non-distended, she has mild lower abdominal TTP, no focal RLQ or LLQ TTP  Back: No CVA TTP  : He has erythematous rash to the suprapubic region, extending to the most superior aspect of her labia there are no vesicular lesions.  No skin sloughing.  Skin: No obvious rash.  Extremities: Warm, well perfused. No clubbing, cyanosis, edema   Neurologic: Grossly normal   Psychiatric: Normal for situation      EKG/LABS  Results for orders placed or performed during the hospital encounter of 05/18/25   URINALYSIS    Collection Time: 05/18/25  5:18 AM    Specimen: Urine   Result Value Ref Range    Color Yellow     Character Clear     Specific Gravity 1.006 <1.035    Ph 7.0 5.0 - 8.0    Glucose Negative Negative mg/dL    Ketones Negative Negative mg/dL    Protein Negative Negative mg/dL    Bilirubin Negative Negative    Urobilinogen, Urine 0.2 <=1.0 EU/dL    Nitrite Negative Negative    Leukocyte Esterase Moderate (A) Negative    Occult Blood Negative Negative    Micro Urine Req Microscopic    CBC WITH DIFFERENTIAL    Collection Time: " 05/18/25  5:18 AM   Result Value Ref Range    WBC 4.8 4.8 - 10.8 K/uL    RBC 4.42 4.20 - 5.40 M/uL    Hemoglobin 13.0 12.0 - 16.0 g/dL    Hematocrit 38.5 37.0 - 47.0 %    MCV 87.1 81.4 - 97.8 fL    MCH 29.4 27.0 - 33.0 pg    MCHC 33.8 32.2 - 35.5 g/dL    RDW 41.2 35.9 - 50.0 fL    Platelet Count 225 164 - 446 K/uL    MPV 9.3 9.0 - 12.9 fL    Neutrophils-Polys 44.30 44.00 - 72.00 %    Lymphocytes 40.80 22.00 - 41.00 %    Monocytes 11.40 0.00 - 13.40 %    Eosinophils 2.70 0.00 - 6.90 %    Basophils 0.60 0.00 - 1.80 %    Immature Granulocytes 0.20 0.00 - 0.90 %    Nucleated RBC 0.00 0.00 - 0.20 /100 WBC    Neutrophils (Absolute) 2.14 1.82 - 7.42 K/uL    Lymphs (Absolute) 1.97 1.00 - 4.80 K/uL    Monos (Absolute) 0.55 0.00 - 0.85 K/uL    Eos (Absolute) 0.13 0.00 - 0.51 K/uL    Baso (Absolute) 0.03 0.00 - 0.12 K/uL    Immature Granulocytes (abs) 0.01 0.00 - 0.11 K/uL    NRBC (Absolute) 0.00 K/uL   COMP METABOLIC PANEL    Collection Time: 05/18/25  5:18 AM   Result Value Ref Range    Sodium 136 135 - 145 mmol/L    Potassium 4.3 3.6 - 5.5 mmol/L    Chloride 101 96 - 112 mmol/L    Co2 25 20 - 33 mmol/L    Anion Gap 10.0 7.0 - 16.0    Glucose 78 65 - 99 mg/dL    Bun 18 8 - 22 mg/dL    Creatinine 1.00 0.50 - 1.40 mg/dL    Calcium 8.9 8.5 - 10.5 mg/dL    Correct Calcium 8.9 8.5 - 10.5 mg/dL    AST(SGOT) 24 12 - 45 U/L    ALT(SGPT) 17 2 - 50 U/L    Alkaline Phosphatase 120 (H) 30 - 99 U/L    Total Bilirubin 0.2 0.1 - 1.5 mg/dL    Albumin 4.0 3.2 - 4.9 g/dL    Total Protein 7.4 6.0 - 8.2 g/dL    Globulin 3.4 1.9 - 3.5 g/dL    A-G Ratio 1.2 g/dL   LIPASE    Collection Time: 05/18/25  5:18 AM   Result Value Ref Range    Lipase 19 11 - 82 U/L   URINE MICROSCOPIC (W/UA)    Collection Time: 05/18/25  5:18 AM   Result Value Ref Range    WBC 3-5 (A) /hpf    RBC 0-2 /hpf    Bacteria Few (A) None /hpf    Epithelial Cells 0-2 0 - 5 /hpf    Urine Casts 0-2 0 - 2 /lpf   ESTIMATED GFR    Collection Time: 05/18/25  5:18 AM   Result Value Ref  Range    GFR (CKD-EPI) 66 >60 mL/min/1.73 m 2         COURSE & MEDICAL DECISION MAKING    ASSESSMENT, COURSE AND PLAN  Care Narrative: This is a 54-year-old who presents with dysuria and suprapubic abdominal pain.  She arrives afebrile with normal vital signs.  She has mild suprapubic tenderness on exam.  Her labs are reassuring she has no leukocytosis, normal renal function, no significant electrolyte derangements.  Her urine is consistent with infection with bacteria, white blood cells and leuk esterase.  A culture has been obtained.  She has no systemic symptoms and no signs of pyelonephritis at this time.  She does not have any unilateral flank pain to suggest an obstructing stone.  I think most likely her suprapubic abdominal pain is attributed to her urinary tract infection.  She has a benign abdominal exam.  She has had a prior appendectomy and I doubt acute appendicitis.  Although she is constipated this is a chronic issue for her as she is not vomiting and she has no other signs to suggest a bowel obstruction.  I did not see any indication to obtain cross-sectional imaging.  Patient was given Toradol, morphine for pain with improvement.  I will prescribe Bactrim she was given her first dose here.    Additionally, she has a nonspecific erythematous rash to her vaginal area.  Does not seem to be consistent with a herpes infection.  Most likely a nonspecific dermatitis recommend low-dose steroid cream which has been prescribed.    He continues to look overall well is tolerating oral intake I think she safe for discharge home on oral antibiotics.  She will return for fever, worsening pain, persistent symptoms or other concern.          DISPOSITION AND DISCUSSIONS  I have discussed management of the patient with the following physicians and TL's:  none    Discussion of management with other QHP or appropriate source(s): None     Escalation of care considered, and ultimately not performed:diagnostic  imaging    Barriers to care at this time, including but not limited to: none.     Decision tools and prescription drugs considered including, but not limited to: Antibiotics prescribed see above.    FINAL DIAGNOSIS  1. Acute UTI Acute   2. Dermatitis Acute        Electronically signed by: Keyona Santiago M.D., 5/18/2025 5:14 AM           [1]   Allergies  Allergen Reactions    Nkda [No Known Drug Allergy]

## 2025-05-18 NOTE — ED NOTES
Pt. Verbalizes understanding of discharge instructions. Pt. Alert/awake in NAD.  All questions answered and understood. Medication teaching done. Advised to ff-up with PCP.

## 2025-05-20 LAB
BACTERIA UR CULT: ABNORMAL
BACTERIA UR CULT: ABNORMAL
SIGNIFICANT IND 70042: ABNORMAL
SITE SITE: ABNORMAL
SOURCE SOURCE: ABNORMAL